# Patient Record
Sex: FEMALE | Race: BLACK OR AFRICAN AMERICAN
[De-identification: names, ages, dates, MRNs, and addresses within clinical notes are randomized per-mention and may not be internally consistent; named-entity substitution may affect disease eponyms.]

---

## 2017-09-24 ENCOUNTER — HOSPITAL ENCOUNTER (EMERGENCY)
Dept: HOSPITAL 62 - ER | Age: 47
Discharge: HOME | End: 2017-09-24
Payer: MEDICAID

## 2017-09-24 VITALS — SYSTOLIC BLOOD PRESSURE: 120 MMHG | DIASTOLIC BLOOD PRESSURE: 78 MMHG

## 2017-09-24 DIAGNOSIS — M79.1: ICD-10-CM

## 2017-09-24 DIAGNOSIS — R51: ICD-10-CM

## 2017-09-24 DIAGNOSIS — J40: Primary | ICD-10-CM

## 2017-09-24 DIAGNOSIS — J01.00: ICD-10-CM

## 2017-09-24 DIAGNOSIS — R50.9: ICD-10-CM

## 2017-09-24 DIAGNOSIS — Z98.51: ICD-10-CM

## 2017-09-24 PROCEDURE — 99283 EMERGENCY DEPT VISIT LOW MDM: CPT

## 2017-09-24 PROCEDURE — 87070 CULTURE OTHR SPECIMN AEROBIC: CPT

## 2017-09-24 PROCEDURE — 87804 INFLUENZA ASSAY W/OPTIC: CPT

## 2017-09-24 PROCEDURE — 87880 STREP A ASSAY W/OPTIC: CPT

## 2017-09-24 NOTE — ER DOCUMENT REPORT
ED Flu Like





- General


Chief Complaint: Flu Symptoms


Stated Complaint: COUGH,HEADACHE,BODY ACHES


Time Seen by Provider: 09/24/17 18:38


Mode of Arrival: Ambulatory


Information source: Patient


Notes: 





Patient is a 47-year-old female who presents to the ER today for 5 days of 

worsening sinus pressure, cough, fever, chills, runny nose and sore throat.  

Patient states that her grandson initially had a cold before her symptoms began 

on that she was taking care of him.  She has not tried anything over-the-

counter except for Dougherty's cough drops.  She admits to fever as high as 101F.  

She denies any history of asthma, she denies shortness of breath or wheezing.


TRAVEL OUTSIDE OF THE [place] IN LAST 30 DAYS: No





- Related Data


Allergies/Adverse Reactions: 


 





No Known Allergies Allergy (Verified 09/24/17 18:15)


 











Past Medical History





- General


Information source: Patient





- Social History


Smoking Status: Never Smoker


Chew tobacco use (# tins/day): No


Frequency of alcohol use: Occasional


Drug Abuse: None


Family History: Reviewed & Not Pertinent





- Past Medical History


Cardiac Medical History: 


   Denies: Hx Coronary Artery Disease, Hx Hypertension


Pulmonary Medical History: 


   Denies: Hx Asthma


Endocrine Medical History: Denies: Hx Diabetes Mellitus Type 1, Hx Diabetes 

Mellitus Type 2


Renal/ Medical History: Denies: Hx Peritoneal Dialysis


Past Surgical History: Reports: Hx Tubal Ligation





- Immunizations


Hx Diphtheria, Pertussis, Tetanus Vaccination: Yes





Review of Systems





- Review of Systems


Constitutional: See HPI


EENT: See HPI


Cardiovascular: No symptoms reported


Respiratory: See HPI


Gastrointestinal: No symptoms reported


Genitourinary: No symptoms reported


Female Genitourinary: No symptoms reported


Musculoskeletal: No symptoms reported


Skin: No symptoms reported


Hematologic/Lymphatic: No symptoms reported


Neurological/Psychological: No symptoms reported





Physical Exam





- Vital signs


Vitals: 


 











Temp Pulse Resp BP Pulse Ox


 


 99.7 F   111 H  18   125/91 H  97 


 


 09/24/17 18:14  09/24/17 18:14  09/24/17 18:14  09/24/17 18:14  09/24/17 18:14














- Notes


Notes: 





PHYSICAL EXAMINATION: 


GENERAL: Mildly ill-appearing, but in no acute distress. 


HEAD: Atraumatic, normocephalic. 


EYES: Pupils equal round and reactive to light, extraocular movements intact, 

sclera anicteric, conjunctiva are normal. 


ENT: ear canals without erythema or foreign body, TMs with air-fluid levels 

behind bilaterally, nares with mucoid discharge, oropharynx erythematous 

without enlarged tonsils and without exudates. Moist mucous membranes.  

Maxillary and frontal sinuses tender to palpation


NECK: Normal range of motion, supple with bilateral cervical lymphadenopathy, 

tender to palpation


LUNGS: Cough, otherwise CTAB and equal. No wheezes rales or rhonchi. 


HEART: Regular rate and rhythm without murmurs


ABDOMEN: Soft, no tenderness. No guarding, no rebound 


BACK: no vertebral tenderness, normal ROM


GI/: no CVA tenderness


EXTREMITIES: Normal range of motion, no pitting edema. No cyanosis. 


NEUROLOGICAL: Cranial nerves grossly intact. Normal sensory/motor exams. 


PSYCH: Normal mood, normal affect. 


SKIN: Warm, Dry, normal turgor, no rashes or lesions noted 

















Course





- Re-evaluation


Re-evalutation: 





09/24/17 19:46


Strep negative, flu negative





- Vital Signs


Vital signs: 


 











Temp Pulse Resp BP Pulse Ox


 


 98.9 F   100   18   120/78   100 


 


 09/24/17 20:15  09/24/17 20:15  09/24/17 18:14  09/24/17 20:15  09/24/17 20:15














Discharge





- Discharge


Clinical Impression: 


 Bronchitis





Sinusitis


Qualifiers:


 Sinusitis location: maxillary Chronicity: acute Recurrence: non-recurrent 

Qualified Code(s): J01.00 - Acute maxillary sinusitis, unspecified





Condition: Stable


Disposition: HOME, SELF-CARE


Additional Instructions: 


Return immediately for any new or worsening symptoms.





Follow up with primary care provider, call tomorrow to make followup 

appointment.


Prescriptions: 


Hydrocodone Bit/Homatropine [Hycodan Syrup 5-1.5 mg/5 ml Ud Cup] 5 ml PO Q4HP 

PRN #120 ml


 PRN Reason: 


Amoxicillin 500 mg PO TID #30 capsule


Fluticasone Propionate [Flonase Nasal Spray 50 Mcg/Spray 16 gm] 2 sprays NASL 

Q12 #1 inhaler


Referrals: 


LOCALMD,NO [Primary Care Provider] - Follow up as needed

## 2017-10-02 ENCOUNTER — HOSPITAL ENCOUNTER (OUTPATIENT)
Dept: HOSPITAL 62 - WI | Age: 47
End: 2017-10-02
Attending: INTERNAL MEDICINE
Payer: COMMERCIAL

## 2017-10-02 DIAGNOSIS — Z12.31: Primary | ICD-10-CM

## 2017-10-02 PROCEDURE — G0202 SCR MAMMO BI INCL CAD: HCPCS

## 2017-10-02 PROCEDURE — 77067 SCR MAMMO BI INCL CAD: CPT

## 2017-10-02 NOTE — WOMENS IMAGING REPORT
EXAM DESCRIPTION:  BILAT SCREENING MAMMO W/CAD



COMPLETED DATE/TIME:  10/2/2017 2:34 pm



REASON FOR STUDY:  SCREENING MAMMO Z12.31  ENCNTR SCREEN MAMMOGRAM FOR MALIGNANT NEOPLASM OF NADINE



COMPARISON:  None.



TECHNIQUE:  Standard craniocaudal and mediolateral oblique views of each breast recorded using digita
l acquisition.



LIMITATIONS:  None.



FINDINGS:  No masses, calcifications or architectural distortion. No areas of suspicion.

Read with the assistance of CAD.

.[facility] - R2 Cenova Version 1.3

.[facility] - R2 Cenova Version 1.3

.[facility] - R2 Cenova Version 2.4

.[facility] [place]2 Cenova Version 2.4

.[facility] - R2  Version 9.2



IMPRESSION:  NORMAL MAMMOGRAM.  BIRADS 1.



BREAST DENSITY:  b. There are scattered areas of fibroglandular density.



BIRAD:  1 NEGATIVE



RECOMMENDATION:  ROUTINE SCREENING



COMMENT:  The patient has been notified of the results by letter per SA requirements. Additional no
tification policies are in place for contacting patient with suspicious or incomplete findings.

Quality ID #[id number]: The American College of Radiology recommends an annual screening mammogram for women
 aged 40 years or over. This facility utilizes a reminder system to ensure that all patients receive 
reminder letters, and/or direct phone calls for appointments. This includes reminders for routine scr
eening mammograms, diagnostic mammograms, or other Breast Imaging Interventions when appropriate.  Th
is patient will be placed in the appropriate reminder system.

The American College of Radiology (ACR) has developed recommendations for screening MRI of the breast
s in certain patient populations, to be used in conjunction with mammography.  Breast MRI surveillanc
e may be appropriate for women with more than 20% lifetime risk of developing breast cancer  as deter
mined by genetic testing, significant family history of the disease, or history of mantle radiation f
or Hodgkins Disease.  ACR Practice Guidelines 2008.



TECHNICAL DOCUMENTATION:  FINDING NUMBER: (1)

ASSESSMENT: (1)

JOB ID:  [id number]

 2011 [employer]- All Rights Reserved

## 2018-06-11 ENCOUNTER — HOSPITAL ENCOUNTER (EMERGENCY)
Dept: HOSPITAL 62 - ER | Age: 48
Discharge: HOME | End: 2018-06-11
Payer: COMMERCIAL

## 2018-06-11 VITALS — DIASTOLIC BLOOD PRESSURE: 75 MMHG | SYSTOLIC BLOOD PRESSURE: 119 MMHG

## 2018-06-11 DIAGNOSIS — G89.29: ICD-10-CM

## 2018-06-11 DIAGNOSIS — M54.2: ICD-10-CM

## 2018-06-11 DIAGNOSIS — M62.838: ICD-10-CM

## 2018-06-11 DIAGNOSIS — M79.602: ICD-10-CM

## 2018-06-11 DIAGNOSIS — M62.830: Primary | ICD-10-CM

## 2018-06-11 PROCEDURE — 99283 EMERGENCY DEPT VISIT LOW MDM: CPT

## 2018-06-11 PROCEDURE — 96372 THER/PROPH/DIAG INJ SC/IM: CPT

## 2018-06-11 NOTE — ER DOCUMENT REPORT
HPI





- HPI


Pain Level: 4


Notes: 





Patient is a 47-year-old female who presents to the ED complaining of muscle 

spasming and tightness to her left trapezius and muscles in her left arm/back 2

-3 weeks.  Patient states that she has been picking up her child repetitively 

over the last few weeks, but denies any other injury.  Patient has tried 

conservative measures for symptoms with minimal relief.  The pain will 

occasionally travel down her left arm.  Patient states that she does have 

chronic neck pain and on occasion the pain will radiate starting from her left 

side of the neck.  Patient states that her spouse did find a knot in her muscle 

that they tried working out the other day.  She denies any drug allergies.  

Denies any headache, fever, head injury, changes in vision/speech/mentation/

hearing, URI, sore throat, chest pain, palpitations, syncope, cough, shortness 

of breath, wheeze, dyspnea, abdominal pain, nausea/vomiting/diarrhea, urinary 

retention, dysuria, hematuria, loss of control of bowel or bladder, numbness/

tingling, muscle paralysis/weakness, or rash.





- ROS


Systems Reviewed and Negative: Yes All other systems reviewed and negative





- CONSTITUTIONAL


Constitutional: DENIES: Fever, Chills





- EENT


EENT: DENIES: Sore Throat, Ear Pain, Eye problems





- NEURO


Neurology: DENIES: Headache, Weakness, Vision blurred, Dizzinesss / Vertigo





- CARDIOVASCULAR


Cardiovascular: DENIES: Chest pain





- RESPIRATORY


Respiratory: DENIES: Trouble Breathing, Coughing





- GASTROINTESTINAL


Gastrointestinal: DENIES: Abdominal Pain, Black / Bloody Stools





- URINARY


Urinary: DENIES: Dysuria, Urgency, Frequency





- MUSCULOSKELETAL


Musculoskeletal: REPORTS: Extremity pain





Past Medical History





- Social History


Smoking Status: Never Smoker


Chew tobacco use (# tins/day): Yes


Frequency of alcohol use: Occasional


Drug Abuse: None


Family History: Reviewed & Not Pertinent


Patient has suicidal ideation: No


Patient has homicidal ideation: No





- Past Medical History


Cardiac Medical History: 


   Denies: Hx Coronary Artery Disease, Hx Hypertension


Pulmonary Medical History: 


   Denies: Hx Asthma


Endocrine Medical History: Denies: Hx Diabetes Mellitus Type 1, Hx Diabetes 

Mellitus Type 2


Renal/ Medical History: Denies: Hx Peritoneal Dialysis


Past Surgical History: Reports: Hx Tubal Ligation





- Immunizations


Hx Diphtheria, Pertussis, Tetanus Vaccination: Yes





Vertical Provider Document





- CONSTITUTIONAL


Agree With Documented VS: Yes


Notes: 





PHYSICAL EXAMINATION:





GENERAL: Well-appearing, well-nourished and in no acute distress.





HEAD: Atraumatic, normocephalic.





EYES: Pupils equal round and reactive to light, extraocular movements intact, 

sclera anicteric, conjunctiva are normal.





ENT: EAC clear b/l.  TM's intact b/l without erythema, fluid, or perforation.  

Nares patent and without discharge.  oropharynx clear without exudates.  No 

tonsilar hypertrophy or erythema.  Moist mucous membranes.  No sinus tenderness.





NECK: Normal range of motion, supple without lymphadenopathy.  Spurling neg.  

No midline tenderness.  + mild tenderness to the left C-paraspinal mm.  + 

tenderness to the left trapezius with spasming noted, correlating with pain 

described.  





LUNGS: Breath sounds clear to auscultation bilaterally and equal.  No wheezes 

rales or rhonchi.





HEART: Regular rate and rhythm without murmurs, rubs, gallops.





Musculoskeletal: Left shoulder:  FROM to passive/active. Strength 5+/5.  N/V 

intact distal.  + tenderness to the lat. mm insertion and to the left lateral 

superior lat mm. which also correlates with pain described.  No bony 

tenderness.  Muscle tone intact.  





Extremities:  No cyanosis, clubbing, or edema b/l.  Peripheral pulses 2+.  

Capillary refill less than 3 seconds.





NEUROLOGICAL:  Normal speech, normal gait.  Normal sensory, motor exams 





PSYCH: Normal mood, normal affect.





SKIN: Warm, Dry, normal turgor, no rashes or lesions noted.





- INFECTION CONTROL


TRAVEL OUTSIDE OF THE [place] IN LAST 30 DAYS: No





Course





- Re-evaluation


Re-evalutation: 





06/11/18 16:38


Patient is an afebrile, well-hydrated, 47-year-old female who presents to the 

ED with trapezius muscle spasm/strain, left arm pain suspect relation with the 

insertion of the latissimus dorsi area.  Vitals are acceptable.  PE is 

otherwise unremarkable for any neurovascular compromise, obvious tendon/

ligament rupture, obvious fracture/dislocation, septic joint.  No labs or 

imaging warranted at this time based on H&P.  Patient has no bony tenderness.  

Sling provided.  Toradol given IM today.  I will send her home with a 

prescription for naproxen as well as baclofen.  Conservative measures otherwise 

for symptoms.  Patient to call orthopedics tomorrow to schedule an appointment 

for further evaluation and management.  Recheck with your PCM in 3-5 days as 

well.  Return to the ED with any worsening/concerning symptoms otherwise as 

reviewed discharge.  Patient is in agreement.





- Vital Signs


Vital signs: 


 











Temp Pulse Resp BP Pulse Ox


 


 98.6 F   98   18   119/75   98 


 


 06/11/18 16:02  06/11/18 16:02  06/11/18 16:02  06/11/18 16:02  06/11/18 16:02














Discharge





- Discharge


Clinical Impression: 


 Left arm pain, Trapezius muscle spasm





Condition: Stable


Disposition: HOME, SELF-CARE


Instructions:  Muscle Relaxers ([facility]), Muscle Strain ([facility])


Additional Instructions: 


Rest, Ice, Compression, Elevation


Use sling as directed


Tylenol/ibuprofen as needed


Light stretches daily


Strength exercises as able


Moist heat and massage may help


F/u with your PCP in 3-5 days for a recheck


Call orthopedics tomorrow to schedule an appointment for further evaluation and 

management





Return to the ED with any worsening symptoms and/or development of fever, 

headache, chest pain, palpitations, syncope, shortness of breath, trouble 

breathing, abdominal pain, n/v/d, muscle weakness/paralysis, numbness/tingling, 

swelling, redness, or other worsening symptoms that are concerning to you.


Prescriptions: 


Baclofen [Baclofen 10 mg Tablet] 5 - 10 mg PO BID PRN #10 tablet


 PRN Reason: 


Naproxen 500 mg PO BID PRN #30 tablet


 PRN Reason: 


Referrals: 


SHIRA ALONSO MD [Primary Care Provider] - Follow up in 3-5 days


[facility] [Provider Group] - Follow up in 3-5 days